# Patient Record
Sex: FEMALE | Race: WHITE
[De-identification: names, ages, dates, MRNs, and addresses within clinical notes are randomized per-mention and may not be internally consistent; named-entity substitution may affect disease eponyms.]

---

## 2018-07-03 ENCOUNTER — HOSPITAL ENCOUNTER (EMERGENCY)
Dept: HOSPITAL 58 - ED | Age: 83
Discharge: HOME | End: 2018-07-03
Payer: COMMERCIAL

## 2018-07-03 VITALS — DIASTOLIC BLOOD PRESSURE: 65 MMHG | TEMPERATURE: 97.9 F | SYSTOLIC BLOOD PRESSURE: 118 MMHG

## 2018-07-03 VITALS — BODY MASS INDEX: 23.5 KG/M2

## 2018-07-03 DIAGNOSIS — M19.90: ICD-10-CM

## 2018-07-03 DIAGNOSIS — M79.605: Primary | ICD-10-CM

## 2018-07-03 DIAGNOSIS — R53.1: ICD-10-CM

## 2018-07-03 DIAGNOSIS — F03.90: ICD-10-CM

## 2018-07-03 DIAGNOSIS — M25.562: ICD-10-CM

## 2018-07-03 PROCEDURE — 99282 EMERGENCY DEPT VISIT SF MDM: CPT

## 2018-07-03 NOTE — DI
EXAM:  Left knee four views 

  

HISTORY:  Pain 

  

FINDINGS:  No comparison imaging.  The patient has underwent previous knee arthroplasty.  The prosthe
tic units appear intact with no evidence of dislodgement or loosening.  No acute fracture is identifi
ed.  There is no joint effusion seen.  Bones are demineralized. 

  

  

IMPRESSION: 

No fracture or dislocation.

## 2018-07-03 NOTE — ED.PDOC
General


ED Provider: 


Dr. SANJEEV PAN





Chief Complaint: Extremity Pain/Injury


Stated Complaint: C/O Left leg , knee pain. Diffiuclty walking. Pt states left 

knee has been hurting for a long time but has been worse for the last 6 months. 

Pt is new at [facility] and was sent out for evaluation of increased difficulty in 

walking and left anterior knee pain.Family states it seems that her condition 

has changed and worsened. No recent trauma. hx prev BL--TKA


Time Seen by Physician: 15:10


Mode of Arrival: Wheelchair


Information Source: Patient, Family


Exam Limitations: No limitations


Nursing and Triage Documentation Reviewed and Agree: Yes


Does patient meet sepsis criteria?: No


System Inflammatory Response Syndrome: Not Applicable


Sepsis Protocol: 


For patient's 13 years and over:





Temp is 96.8 and below  and greater


Pulse >90 BPM


Resp >20/minute


Acutely Altered Mental Status





Are patient's symptoms suggestive of a new infection, such as:


   -Pneumonia


   -Skin, Soft Tissue


   -Endocarditis


   -UTI


   -Bone, Joint Infection


   -Implantable Device


   -Acute Abdominal Infection


   -Wound Infection


   -Meningitis


   -Blood Stream Catheter Infection


   -Unknown








Musculoskeletal Complaint Exam





- Knee Pain Complaint/Exam


Mechanism of Injury: Reports: No known trauma


Onset/Duration: changed in last week


Symptoms Are: Still present


Onset of Pain: Reports: Prior to arrival


Initial Severity: Moderate


Current Severity: Mild


Location: Reports: Diffuse


Character: Reports: Dull, Aching


Alleviating: Reports: Rest


Aggravating: Reports: Movement


Associated Signs and Symptoms: Denies: Swelling, Redness, Bruising, Fever, 

Weakness, Numbness, Tingling


Able to Bear Weight: Yes


Related History: Reports: Similar episode


Septic Arthritis Risk Factors: Reports: None


Gout Risk Factors: Reports: None


Related Surgical History: Reports: Right Knee, Left Knee


Knee Findings: Present: Tenderness


Tenderness: Absent: Pre-patellar, Joint, Tibial Tuberosity


Lachman Test Positive: No


Danielle Test Positive: No


Limited Range of Motion: Absent: Active, Passive, Flexion, Extension, Patellar 

apprehension


Differential Diagnoses: Contusion, Patellofemoral Syndrome, Strain





Review of Systems





- Review Of Systems


Constitutional: Reports: No symptoms


Eyes: Reports: No symptoms


Ears, Nose, Mouth, Throat: Reports: No symptoms


Respiratory: Reports: No symptoms


Cardiac: Reports: No symptoms


GI: Reports: No symptoms


: Reports: No symptoms


Musculoskeletal: Reports: No symptoms, Joint pain


Skin: Reports: No symptoms


Neurological: Reports: No symptoms


Endocrine: Reports: No symptoms


Hematologic/Lymphatic: Reports: No symptoms


All Other Systems: Reviewed and Negative





Past Medical History





- Past Medical History


Previously Healthy: Yes


Endocrine: Reports: None


Cardiovascular: Reports: None, Hypertension


Respiratory: Reports: None


Hematological: Reports: None


Gastrointestinal: Reports: None


Genitourinary: Reports: None, Other (oxybutynin)


Neuro/Psych: Reports: None, Anxiety, Dementia


Musculoskeletal: Reports: None, Arthritis, Joint Pain


Cancer: Reports: None


Last Menstrual Period: none





- Surgical History


General Surgical History: Reports: None





- Family History


Family History: Reports: None





- Social History


Smoking Status: Never smoker


Hx Substance Use: No


Alcohol Screening: None





Physical Exam





- Physical Exam


Appearance: Well-appearing, No pain distress, Well-nourished


Eyes: MEIR, EOMI, Conjunctiva clear


ENT: Ears normal, Nose normal, Oropharynx normal


Respiratory: Airway patent, Breath sounds clear, Breath sounds equal, 

Respirations nonlabored


Cardiovascular: RRR, Pulses normal, No rub, No murmur


GI/: Soft, Nontender, No masses, Bowel sounds normal, No Organomegaly


Musculoskeletal: Normal strength, ROM intact (no localized joint restriction, 

no appreciable effusion ), No edema, No calf tenderness


Skin: Warm, Dry, Normal color


Neurological: Sensation intact, Motor intact, Reflexes intact, Cranial nerves 

intact, Alert, Oriented


Psychiatric: Affect appropriate, Mood appropriate





Interpretation





- Radiology Interpretation


Radiology Interpretation By: Radiologist


Radiology Results: No acute changes


Exam Interpreted: Other (pelvis and knee xrays)





Critical Care Note





- Critical Care Note


Total Time (mins): 0





Course





- Course


Orders, Labs, Meds: 


Orders











 Category Date Time Status


 


 KNEE, LEFT 4 VIEWS Stat RADS  07/03/18 16:08 Completed


 


 KNEE, RIGHT 4 VIEWS Stat RADS  07/03/18 16:08 Completed


 


 PELVIS & CHAU HIPS Stat RADS  07/03/18 16:08 Completed











Vital Signs: 


 











  Temp Pulse Resp BP Pulse Ox


 


 07/03/18 13:57  97.9 F  60  18  118/65  99














Departure





- Departure


Time of Disposition: 17:40


Disposition: HOME SELF-CARE


Discharge Problem: 


 Weakness, Leg pain, Degenerative arthritis, Dementia





Instructions:  Arthralgia (ED), Leg Pain (ED)


Condition: Fair


Pt referred to PMD for follow-up: Yes (1 week)


IPMP verified?: No


Allergies/Adverse Reactions: 


Allergies





Iodinated Contrast- Oral and IV Dye [Iodinated Contrast Media - IV Dye] Adverse 

Reaction (Verified 10/28/13 19:03)


 








Home Medications: 


Ambulatory Orders





Fluticasone/Salmeterol [Advair 100-50 Diskus] 1 puff IH Q12H PRN 08/05/13 


Metoprolol Succinate [Toprol Xl] 25 mg PO DAILY 08/05/13 


Sertraline HCl [Zoloft] 100 mg PO DAILY 08/05/13 


Lisinopril/Hydrochlorothiazide [Zestoretic 20-12.5 mg Tablet] 1 each PO DAILY 10

/28/13 


Alprazolam 0.25 mg PO Q8HR PRN 07/03/18 


Aspirin [Aspirin EC] 81 mg PO DAILY 07/03/18 


Atorvastatin Calcium 40 mg PO BEDTIME 07/03/18 


Cyanocobalamin (Vitamin B-12) [Cyanocobalamin Injection] 1,000 mcg IJ MONTHLY 07 /03/18 


Donepezil HCl 5 mg PO DAILY 07/03/18 


Oxybutynin Chloride [Ditropan Xl] 5 mg PO DAILY 07/03/18 


Tramadol HCl 50 mg PO Q8HR PRN 07/03/18 








Disposition Discussed With: Patient, Family


Additional Information: 





Explained to family no radiographic finding of acute injury or abnormalities of 

the prev TKA

## 2018-07-03 NOTE — DI
EXAM:  Right knee, four view 

  

HISTORY:  Pain 

  

COMPARISON:  None 

  

FINDINGS/IMPRESSION:  There is right knee arthroplasty.  No fracture, dislocation, or evidence for lo
osening.

## 2018-07-03 NOTE — DI
EXAM:  Pelvis and bilateral hips 

  

HISTORY:  Pain 

  

COMPARISON:  None 

  

TECHNIQUE:  Single view pelvis and single frog-leg view right and left hip were performed 

  

FINDINGS:  Bones appear demineralized.  Sacroiliac joints intact.  Sacral arcuate intact.  Hip joints
 appear normal.  No fracture or dislocation. Degenerative change in the spine. Surgical changes in th
e pelvis. 

  

IMPRESSION:  No fracture or dislocation.

## 2018-08-24 ENCOUNTER — HOSPITAL ENCOUNTER (INPATIENT)
Dept: HOSPITAL 58 - ED | Age: 83
LOS: 6 days | Discharge: NURSING, REHAB OR LONG-TERM CARE | DRG: 690 | End: 2018-08-30
Attending: INTERNAL MEDICINE | Admitting: INTERNAL MEDICINE

## 2018-08-24 VITALS — BODY MASS INDEX: 25.4 KG/M2

## 2018-08-24 DIAGNOSIS — R06.02: ICD-10-CM

## 2018-08-24 DIAGNOSIS — N39.0: Primary | ICD-10-CM

## 2018-08-24 DIAGNOSIS — I10: ICD-10-CM

## 2018-08-24 DIAGNOSIS — R63.0: ICD-10-CM

## 2018-08-24 DIAGNOSIS — R10.9: ICD-10-CM

## 2018-08-24 DIAGNOSIS — E78.5: ICD-10-CM

## 2018-08-24 DIAGNOSIS — F03.90: ICD-10-CM

## 2018-08-24 DIAGNOSIS — K56.41: ICD-10-CM

## 2018-08-24 DIAGNOSIS — R41.0: ICD-10-CM

## 2018-08-24 DIAGNOSIS — E86.0: ICD-10-CM

## 2018-08-24 DIAGNOSIS — E87.6: ICD-10-CM

## 2018-08-24 DIAGNOSIS — R53.1: ICD-10-CM

## 2018-08-24 PROCEDURE — 84443 ASSAY THYROID STIM HORMONE: CPT

## 2018-08-24 PROCEDURE — 82550 ASSAY OF CK (CPK): CPT

## 2018-08-24 PROCEDURE — 99284 EMERGENCY DEPT VISIT MOD MDM: CPT

## 2018-08-24 PROCEDURE — 85025 COMPLETE CBC W/AUTO DIFF WBC: CPT

## 2018-08-24 PROCEDURE — 84439 ASSAY OF FREE THYROXINE: CPT

## 2018-08-24 PROCEDURE — 82803 BLOOD GASES ANY COMBINATION: CPT

## 2018-08-24 PROCEDURE — 36415 COLL VENOUS BLD VENIPUNCTURE: CPT

## 2018-08-24 PROCEDURE — 80053 COMPREHEN METABOLIC PANEL: CPT

## 2018-08-24 PROCEDURE — 97802 MEDICAL NUTRITION INDIV IN: CPT

## 2018-08-24 PROCEDURE — 84484 ASSAY OF TROPONIN QUANT: CPT

## 2018-08-24 PROCEDURE — 83605 ASSAY OF LACTIC ACID: CPT

## 2018-08-24 PROCEDURE — 84145 PROCALCITONIN (PCT): CPT

## 2018-08-24 PROCEDURE — 93005 ELECTROCARDIOGRAM TRACING: CPT

## 2018-08-24 PROCEDURE — 87081 CULTURE SCREEN ONLY: CPT

## 2018-08-24 PROCEDURE — 87040 BLOOD CULTURE FOR BACTERIA: CPT

## 2018-08-24 PROCEDURE — 93010 ELECTROCARDIOGRAM REPORT: CPT

## 2018-08-24 PROCEDURE — 81001 URINALYSIS AUTO W/SCOPE: CPT

## 2018-08-24 RX ADMIN — SODIUM CHLORIDE SCH: 0.9 INJECTION, SOLUTION INTRAVENOUS at 18:00

## 2018-08-24 NOTE — CT
EXAM:  CT ABDOMEN AND PELVIS 

  

HISTORY:  Abdominal pain, nausea and vomiting 

  

TECHNIQUE:  CT abdomen and pelvis without intravenous contrast.  Images were reconstructed using 3 mm
 section thickness.  Reformations were prepared. 

COMPARISON:  None 

  

FINDINGS: 

  

Diagnostic limitations exist without including contrast enhanced images.  There is a 1.5 cm low atten
uation lesion of the inferior right hepatic lobe which could represent a cyst although is indetermina
te.  Pancreas is within normal limits.  No definite gallbladder is seen. Mild ectasia of the common b
ile duct without gross evidence of choledocholithiasis.  No definite pancreatic inflammation or mass.
  Adrenal glands are within normal limits.  Kidneys and ureters are unremarkable.  There is mild to m
oderate atherosclerotic disease. 

  

Stomach appears grossly unremarkable.  An appendix, if present is not seen.  There may be circumferen
tial wall thickening of the lower cecum.  Moderate amount retained fecal material within the rectal v
barrie distending the rectum to about 6.6 cm. The lowermost rectum is nondistended and not adequately e
valuated on this exam, regional mass or stricture not excluded.  Bowel gas pattern is nonobstructive.
  Urinary bladder is unremarkable.  No uterus is seen.  There is no ascites. 

  

Postop changes of the ventral abdominal wall without herniation.  Bones reveal generalized deminerali
zation and moderately severe degenerative changes of the spine with scoliosis.  No pneumoperitoneum i
s seen.  See also same day CT thorax report. 

  

  

IMPRESSION: 

1.  There may be circumferential wall thickening of the lower cecum. Neoplasia cannot be excluded. 

2.  Moderate amount retained fecal material within the rectal vault distending the rectum to about 6.
6 cm. Early fecal impaction of the rectum should be considered. The lowermost rectum is nondistended 
and not adequately evaluated on this exam, regional mass or stricture not excluded.  Bowel gas patter
n is nonobstructive.

## 2018-08-24 NOTE — ED.PDOC
General


ED Provider: 


Dr. YENI REVELES





Chief Complaint: Nausea/Vomiting


Stated Complaint: shortness of breath nausea , vomiting


Time Seen by Physician: 15:30


Mode of Arrival: Walk-In


Information Source: EMT


Exam Limitations: No limitations


Primary Care Provider: 


ALYX GIBSON





Nursing and Triage Documentation Reviewed and Agree: Yes


Does patient meet sepsis criteria?: No (seen with pt's nurse  and paul and 

family)


System Inflammatory Response Syndrome: Not Applicable (refused oxygen )


Sepsis Protocol: 


For patient's 13 years and over:





Temp is 96.8 and below  and greater


Pulse >90 BPM


Resp >20/minute


Acutely Altered Mental Status





Are patient's symptoms suggestive of a new infection, such as:


   -Pneumonia


   -Skin, Soft Tissue


   -Endocarditis


   -UTI


   -Bone, Joint Infection


   -Implantable Device


   -Acute Abdominal Infection


   -Wound Infection


   -Meningitis


   -Blood Stream Catheter Infection


   -Unknown








Respiratory Complaint Exam





- Shortness of Air Complaint/Exam


Symptoms Are: Still present


Timing: Intermittent


Initial Severity: Moderate


Current Severity: Mild


Character: Reports: Dyspnea at rest


Aggravating: Reports: Recumbent position


Alleviating: Reports: None


Associated Signs and Symptoms: Reports: Cough.  Denies: Wheezing, Chest pain 

with cough, Chest pain, Fever, Chills, Diaphoresis, Nasal congestion, Dizziness

, Calf pain, Calf swelling, Edema, Rapid breathing, Labored breathing, 

Decreased intake


History of Healthcare-Acquired Pneumonia: No


Pulmonary Embolism Risk Factors: Reports: Bedrest


Tuberculosis Risk Factors: Reports: None


Home Oxygen Use: No


Recent Stress Test: No


Recent Echo/LV Function: No


Respiratory Distress: None


Stridor Present: No


Tracheal Deviation: No


Subcutaneous Emphysema: No


Accessory Muscle Use: No


Retractions: Not Present


Diminished Breath Sounds: No


Prolonged Expiratory Phase: No


Unable to Speak Full Sentences: No


Fatigue: No


Leg Swelling: No


Nohemy's Sign Present: No


Grunting Respirations: No


Kussmaul Respirations: No


Differential Diagnoses: Pneumonia





Review of Systems





- Review Of Systems


Constitutional: Reports: Malaise, Weakness


Eyes: Reports: No symptoms


Ears, Nose, Mouth, Throat: Reports: No symptoms


Respiratory: Reports: Cough, Short of air


Cardiac: Reports: No symptoms


GI: Reports: Abdominal pain, Nausea, Vomiting


: Reports: No symptoms


Musculoskeletal: Reports: No symptoms


Skin: Reports: No symptoms


Neurological: Reports: No symptoms


Endocrine: Reports: No symptoms


Hematologic/Lymphatic: Reports: No symptoms


All Other Systems: Reviewed and Negative





Past Medical History





- Past Medical History


Previously Healthy: Yes


Endocrine: Reports: None


Cardiovascular: Reports: None, Hypertension


Respiratory: Reports: None


Hematological: Reports: None


Gastrointestinal: Reports: None


Genitourinary: Reports: None, Other (oxybutynin)


Neuro/Psych: Reports: None, Anxiety, Dementia


Musculoskeletal: Reports: None, Arthritis, Joint Pain


Cancer: Reports: None


Last Menstrual Period: N/A





- Surgical History


General Surgical History: Reports: None





- Family History


Family History: Reports: None





- Social History


Smoking Status: Never smoker


Hx Substance Use: No


Alcohol Screening: None





- Immunizations


Tetanus Shot up to Date: Yes





Physical Exam





- Physical Exam


Appearance: Ill-appearing


Ill-appearing: Moderate


Pain Distress: Mild


Eyes: MEIR, EOMI, Conjunctiva clear


ENT: Dry mucosa


Respiratory: Airway patent, Breath sounds clear, Breath sounds equal, 

Respirations nonlabored


Cardiovascular: RRR, Pulses normal, No rub, No murmur


GI/: Soft, Nontender, No masses, Bowel sounds normal, No Organomegaly


Musculoskeletal: Normal strength, ROM intact, No edema, No calf tenderness


Skin: Warm, Dry, Normal color


Neurological: Sensation intact, Motor intact, Reflexes intact, Cranial nerves 

intact, Alert, Oriented


Psychiatric: Affect appropriate, Mood appropriate





Interpretation





- Radiology Interpretation


Radiology Interpretation By: Radiologist


Radiology Results: Positive (colonic neoplasm)





Physician Notification





- Case Discussed


Physician Notified: pmd


Time of Notification: 17:33 (admitt )


Admit To: Inpatient (telm)





Critical Care Note





- Critical Care Note


Total Time (mins): 0





Course





- Course


Hematology/Chemistry: 


 08/24/18 16:06





 08/24/18 16:06


Orders, Labs, Meds: 





Lab Review











  08/24/18 08/24/18 08/24/18





  15:34 16:06 16:06


 


WBC   5.21 


 


RBC   4.42 


 


Hgb   12.8 


 


Hct   39.0 


 


MCV   88.2 


 


MCH   29.0 


 


MCHC   32.8 


 


RDW Coeff of Nickie   13.8 


 


Plt Count   212 


 


Immature Gran % (Auto)   0.4 


 


Neut % (Auto)   70.7 


 


Lymph % (Auto)   16.7 


 


Mono % (Auto)   8.3 


 


Eos % (Auto)   3.3 


 


Baso % (Auto)   0.6 


 


Immature Gran # (Auto)   0.0 


 


Neut # (Auto)   3.7 


 


Lymph # (Auto)   0.9 


 


Mono # (Auto)   0.4 


 


Eos # (Auto)   0.2 


 


Baso # (Auto)   0.0 


 


Puncture Site  Rbrach  


 


O2 Saturation  98.0  


 


ABG pH  7.601 H*  


 


ABG pCO2  26.6 L  


 


ABG pO2  88.0  


 


ABG HCO3  26.2 H  


 


ABG Total CO2  27  


 


ABG Base Excess  5 H  


 


FiO2 %  21.0  


 


Sodium    136


 


Potassium    3.7


 


Chloride    97 L


 


Carbon Dioxide    30


 


Anion Gap    12.7


 


BUN    23 H


 


Creatinine    1.09


 


Estimated GFR (MDRD)    47.00


 


BUN/Creatinine Ratio    21.10


 


Glucose    107


 


Lactic Acid   


 


Calcium    9.7


 


Total Bilirubin    0.9


 


AST    29


 


ALT    20


 


Alkaline Phosphatase    121


 


Total Creatine Kinase    99


 


Troponin I    0.1040


 


Total Protein    6.6


 


Albumin    3.4


 


Globulin    3.2


 


Albumin/Globulin Ratio    1.06


 


Procalcitonin   














  08/24/18 08/24/18





  16:06 16:06


 


WBC  


 


RBC  


 


Hgb  


 


Hct  


 


MCV  


 


MCH  


 


MCHC  


 


RDW Coeff of Nickie  


 


Plt Count  


 


Immature Gran % (Auto)  


 


Neut % (Auto)  


 


Lymph % (Auto)  


 


Mono % (Auto)  


 


Eos % (Auto)  


 


Baso % (Auto)  


 


Immature Gran # (Auto)  


 


Neut # (Auto)  


 


Lymph # (Auto)  


 


Mono # (Auto)  


 


Eos # (Auto)  


 


Baso # (Auto)  


 


Puncture Site  


 


O2 Saturation  


 


ABG pH  


 


ABG pCO2  


 


ABG pO2  


 


ABG HCO3  


 


ABG Total CO2  


 


ABG Base Excess  


 


FiO2 %  


 


Sodium  


 


Potassium  


 


Chloride  


 


Carbon Dioxide  


 


Anion Gap  


 


BUN  


 


Creatinine  


 


Estimated GFR (MDRD)  


 


BUN/Creatinine Ratio  


 


Glucose  


 


Lactic Acid  6.2 


 


Calcium  


 


Total Bilirubin  


 


AST  


 


ALT  


 


Alkaline Phosphatase  


 


Total Creatine Kinase  


 


Troponin I  


 


Total Protein  


 


Albumin  


 


Globulin  


 


Albumin/Globulin Ratio  


 


Procalcitonin   < 0.05








Orders











 Category Date Time Status


 


 ABG DRAW REQUEST Stat CARDIO  08/24/18 15:34 Completed


 


 EKG-(ED ONLY) Stat CARDIO  08/24/18 15:34 Completed


 


 ED IV/MEDIPORT/POWERPORT .ONCE EMERGENCY  08/24/18 15:35 Active


 


 ABG Stat LAB  08/24/18 15:34 Completed


 


 BLOOD CULTURE Stat LAB  08/24/18 16:06 Received


 


 CBC W/ AUTO DIFF Stat LAB  08/24/18 16:06 Completed


 


 COMPREHENSIVE METABOLIC PANEL Stat LAB  08/24/18 16:06 Completed


 


 CREATINE KINASE Stat LAB  08/24/18 16:06 Completed


 


 LACTIC ACID Stat LAB  08/24/18 16:06 Completed


 


 PROCALCITONIN Stat LAB  08/24/18 16:06 Completed


 


 TROPONIN I Stat LAB  08/24/18 16:06 Completed


 


 URINALYSIS C & S IF INDICATED Stat LAB  08/24/18 15:34 Uncollected


 


 0.9 % Sodium Chloride [Saline Flush] MEDS  08/24/18 15:35 Active





 1 syr IVF PRN PRN   


 


 Sodium Chloride 0.9% [Sodium Chloride] 1,000 ml MEDS  08/24/18 15:35 Active





  mls/hr   


 


 CT ABDOMEN/PELVIS WO CONTRAST Stat RADS  08/24/18 15:35 Completed


 


 CT CHEST W/O CONTRAST Stat RADS  08/24/18 15:35 Completed








Medications











Generic Name Dose Route Start Last Admin





  Trade Name Freq  PRN Reason Stop Dose Admin


 


Sodium Chloride  1,000 mls @ 125 mls/hr  08/24/18 15:35  08/24/18 15:30





  Sodium Chloride  IV  08/24/18 23:34  125 mls/hr





  .Q8H STA   Administration





     





     





     





     


 


Sodium Chloride  1 syr  08/24/18 15:35  





  Saline Flush  IVF   





  PRN PRN   





  To flush IV   





     





     





     











Vital Signs: 





 











  Temp Pulse Resp BP Pulse Ox


 


 08/24/18 15:28  97.1 F L  58 L  18  115/48 L  93 L














Departure





- Departure


Time of Disposition: 17:33 (family  stated pt is DNR OMD NITIFIED )


Disposition: ADMITTED AS INPATIENT


Discharge Problem: 


 Nausea, Vomiting, Shortness of breath





Condition: Good


Pt referred to PMD for follow-up: Yes


IPMP verified?: No


Additional Instructions: 


Please call your Family Physician as soon as possible to schedule a follow-up 

appointment.


Allergies/Adverse Reactions: 


Allergies





Iodinated Contrast- Oral and IV Dye [Iodinated Contrast Media - IV Dye] Adverse 

Reaction (Verified 10/28/13 19:03)


 








Home Medications: 


Ambulatory Orders





Fluticasone/Salmeterol [Advair 100-50 Diskus] 1 puff IH Q12H PRN 08/05/13 


Metoprolol Succinate [Toprol Xl] 25 mg PO DAILY 08/05/13 


Sertraline HCl [Zoloft] 100 mg PO DAILY 08/05/13 


Lisinopril/Hydrochlorothiazide [Zestoretic 20-12.5 mg Tablet] 1 each PO DAILY 10

/28/13 


Alprazolam 0.25 mg PO Q8HR PRN 07/03/18 


Aspirin [Aspirin EC] 81 mg PO DAILY 07/03/18 


Atorvastatin Calcium 40 mg PO BEDTIME 07/03/18 


Cyanocobalamin (Vitamin B-12) [Cyanocobalamin Injection] 1,000 mcg IJ MONTHLY 07 /03/18 


Donepezil HCl 5 mg PO DAILY 07/03/18 


Oxybutynin Chloride [Ditropan Xl] 5 mg PO DAILY 07/03/18 


Tramadol HCl 50 mg PO Q8HR PRN 07/03/18 








Disposition Discussed With: Patient

## 2018-08-24 NOTE — CT
EXAM:  CT of the chest without contrast 

  

History:  Cough. 

  

Comparison:  Chest radiograph 10/20/2013, CT abdomen pelvis 08/24/2018 

  

Technique:  Multiplanar CT images through the chest were obtained without the administration of IV co
ntrast 

  

Findings:  Heart size is upper limits of normal.  Coronary calcifications.  Trace anterior pericardia
l effusion.  No thoracic aortic aneurysm.  No axillary adenopathy. There is subsegmental atelectasis 
within the lingula.  No consolidated pneumonia.  No pleural fluid and no pneumothorax.  No lung avni
s or lung nodules. 

  

For details in the upper abdomen, please see dedicated CT abdomen pelvis done on the same day.  No ac
[place]ine osseous abnormalities.  Degenerative changes of the spine. A few subacute anterior right rib frac
tures with callous formation. 

  

Impression: 

1.  Plate-like atelectasis within the lingula but no consolidated pneumonia. 

2.  Coronary artery disease

## 2018-08-25 RX ADMIN — DONEPEZIL HYDROCHLORIDE ONE MG: 10 TABLET, FILM COATED ORAL at 09:49

## 2018-08-25 RX ADMIN — LISINOPRIL AND HYDROCHLOROTHIAZIDE ONE TAB: 20; 12.5 TABLET ORAL at 09:47

## 2018-08-25 RX ADMIN — ASPIRIN SCH: 81 TABLET, COATED ORAL at 09:35

## 2018-08-25 RX ADMIN — SERTRALINE HYDROCHLORIDE SCH: 50 TABLET, FILM COATED ORAL at 21:01

## 2018-08-25 RX ADMIN — CEFTRIAXONE SODIUM SCH MLS/HR: 1 INJECTION, POWDER, FOR SOLUTION INTRAMUSCULAR; INTRAVENOUS at 09:22

## 2018-08-25 RX ADMIN — ASPIRIN ONE: 81 TABLET, COATED ORAL at 09:56

## 2018-08-25 RX ADMIN — SERTRALINE HYDROCHLORIDE SCH MG: 50 TABLET, FILM COATED ORAL at 20:59

## 2018-08-25 RX ADMIN — ASPIRIN ONE MG: 81 TABLET, COATED ORAL at 09:48

## 2018-08-25 RX ADMIN — LISINOPRIL AND HYDROCHLOROTHIAZIDE ONE: 20; 12.5 TABLET ORAL at 09:57

## 2018-08-25 RX ADMIN — SODIUM CHLORIDE SCH MLS/HR: 0.9 INJECTION, SOLUTION INTRAVENOUS at 09:23

## 2018-08-25 RX ADMIN — DONEPEZIL HYDROCHLORIDE ONE: 10 TABLET, FILM COATED ORAL at 09:56

## 2018-08-25 RX ADMIN — DONEPEZIL HYDROCHLORIDE SCH: 10 TABLET, FILM COATED ORAL at 09:34

## 2018-08-26 RX ADMIN — ONDANSETRON PRN MG: 2 INJECTION INTRAMUSCULAR; INTRAVENOUS at 17:00

## 2018-08-26 RX ADMIN — SERTRALINE HYDROCHLORIDE SCH MG: 50 TABLET, FILM COATED ORAL at 21:01

## 2018-08-26 RX ADMIN — SODIUM CHLORIDE SCH MLS/HR: 0.9 INJECTION, SOLUTION INTRAVENOUS at 00:29

## 2018-08-26 RX ADMIN — DONEPEZIL HYDROCHLORIDE SCH MG: 10 TABLET, FILM COATED ORAL at 10:05

## 2018-08-26 RX ADMIN — ASPIRIN SCH MG: 81 TABLET, COATED ORAL at 10:05

## 2018-08-26 RX ADMIN — CEFTRIAXONE SODIUM SCH MLS/HR: 1 INJECTION, POWDER, FOR SOLUTION INTRAMUSCULAR; INTRAVENOUS at 10:05

## 2018-08-26 RX ADMIN — SODIUM CHLORIDE SCH MLS/HR: 0.9 INJECTION, SOLUTION INTRAVENOUS at 17:01

## 2018-08-27 RX ADMIN — SERTRALINE HYDROCHLORIDE SCH MG: 50 TABLET, FILM COATED ORAL at 22:05

## 2018-08-27 RX ADMIN — DONEPEZIL HYDROCHLORIDE SCH MG: 10 TABLET, FILM COATED ORAL at 10:58

## 2018-08-27 RX ADMIN — ASPIRIN SCH MG: 81 TABLET, COATED ORAL at 10:59

## 2018-08-27 RX ADMIN — DONEPEZIL HYDROCHLORIDE SCH: 10 TABLET, FILM COATED ORAL at 10:30

## 2018-08-27 RX ADMIN — ASPIRIN SCH: 81 TABLET, COATED ORAL at 10:30

## 2018-08-27 RX ADMIN — SODIUM CHLORIDE SCH MLS/HR: 0.9 INJECTION, SOLUTION INTRAVENOUS at 06:51

## 2018-08-27 RX ADMIN — CEFTRIAXONE SODIUM SCH MLS/HR: 1 INJECTION, POWDER, FOR SOLUTION INTRAMUSCULAR; INTRAVENOUS at 10:58

## 2018-08-27 NOTE — PN
DATE OF SERVICE:  08/25/18



SUBJECTIVE:  

81-year-old white female hospitalized with shortness of breath. The patient 
also has change in mental status with drowsiness and sleepiness for the past 
couple of weeks.  was present in the room. He wants DNR. The patient's 
labs are acceptable. 



REVIEW OF SYSTEMS: 

CONSTITUTIONAL: No night sweats. No fatigue, malaise, lethargy. No fever or 
chills.

HEENT: Eyes: No visual changes. No eye pain. No eye discharge. ENT: No runny 
nose. No epistaxis. No sinus pain. No sore throat. No odynophagia. No 
congestion.

RESPIRATORY: No cough, no congestion. No hemoptysis. No shortness of breath. 

CARDIOVASCULAR: No angina symptoms. No CHF symptoms. No atypical chest pain for 
CAD. No palpitations. No orthopnea.

GASTROINTESTINAL: Appetite is poor.  No abdominal pain. No nausea or vomiting. 
No diarrhea or constipation. No hematemesis. No hematochezia.

GENITOURINARY: No urgency. No frequency. No dysuria. No hematuria. No 
obstructive symptoms. No discharge. No pain. No significant abnormal bleeding.

MUSCULOSKELETAL: No musculoskeletal pain; no joint swelling. 

NEUROLOGICAL: Sleepy. No headache. No neck pain. No syncope. No seizures. No 
dizziness.

PSYCHIATRIC: Not anxious. No depression. No suicidal thoughts. No homicidal 
thoughts.

SKIN:  No rash. No lesions. No wounds.

ENDOCRINE: No unexplained weight loss. No weight gain. 

HEMATOLOGIC/LYMPHATIC: No anemia. No purpura. No petechiae. No prolonged or 
excessive bleeding. No palpable lymph nodes.



PHYSICAL EXAMINATION: 

V/S: Temperature 98.3, pulse 54, respiratory rate 14, BP 90/42, pulse ox 97%. 

HEENT:  Head normocephalic, atraumatic.  Eyes: Extraocular muscles are intact.  
Pupils are equal, round and reactive to light and accommodation.  Ears:  No 
lesions.  Nose appeared normal. Throat: No exudate or erythema.

NECK: Supple. No JVD, no carotid bruit.  No lymphadenopathy or thyromegaly. 

LUNGS:  Clear to auscultation.  Percussion note normal.  Chest symmetrical. 

HEART:  S1, S2, no S3. No murmurs.  No cyanosis or clubbing.  No ascites.  
Pulses: Dorsalis pedis and posterior tibial pulses +1 to +2 both sides. 

ABDOMEN:  Soft.  Nontender.  Bowel sounds active. No CVA tenderness. No mass 
felt. 

EXTREMITIES:  No edema.  Full range of motion of all extremities, equal. 

NEUROLOGIC:  No focal deficit. Cranial nerves II through XII are grossly 
intact.  No headache, no double vision or headache. 

SKIN: Not dry.  Intact.  Turgor - normal. 

LYMPHATIC:  No palpable lymph nodes/no lymphedema. 

MUSCULOSKELETAL:  Normal joints with no swelling. Muscle tone is normal. 



PLAN:

1.  D/C Zoloft, Atorvastatin, Zestoretic and Oxybutynin





CONDITION: Stable



PROGNOSIS:  Guarded. 

TIME SPENT:  More than 30 minutes. 



Plan and coordination of the patient's care discussed in the presence of nurse. 
HERRERA

## 2018-08-27 NOTE — PN
DATE OF SERVICE:  08/26/18



SUBJECTIVE:  

The patient seems to be doing the same but she is awake. She is uncooperative. 
BP systolic is 99. All the hypertensive medications have been held. 



REVIEW OF SYSTEMS: 

CONSTITUTIONAL: No night sweats. No fatigue, malaise, lethargy. No fever or 
chills.

HEENT: Eyes: No visual changes. No eye pain. No eye discharge. ENT: No runny 
nose. No epistaxis. No sinus pain. No sore throat. No odynophagia. No 
congestion.

RESPIRATORY: No cough, no congestion. No hemoptysis. No shortness of breath. 

CARDIOVASCULAR: No angina symptoms. No CHF symptoms. No atypical chest pain for 
CAD. No palpitations. No orthopnea.

GASTROINTESTINAL: No abdominal pain. No nausea or vomiting. No diarrhea or 
constipation. No hematemesis. No hematochezia.

GENITOURINARY: No urgency. No frequency. No dysuria. No hematuria. No 
obstructive symptoms. No discharge. No pain. No significant abnormal bleeding.

MUSCULOSKELETAL: No musculoskeletal pain; no joint swelling. 

NEUROLOGICAL: Confused but not in distress. No headache. No neck pain. No 
syncope. No seizures. No dizziness.

PSYCHIATRIC: Not anxious. No depression. No suicidal thoughts. No homicidal 
thoughts.

SKIN:  No rash. No lesions. No wounds.

ENDOCRINE: No unexplained weight loss. No weight gain. 

HEMATOLOGIC/LYMPHATIC: No anemia. No purpura. No petechiae. No prolonged or 
excessive bleeding. No palpable lymph nodes.



PHYSICAL EXAMINATION: 

HEENT:  Head normocephalic, atraumatic.  Eyes: Extraocular muscles are intact.  
Pupils are equal, round and reactive to light and accommodation.  Ears:  No 
lesions.  Nose appeared normal. Throat: No exudate or erythema.

NECK: Supple. No JVD, no carotid bruit.  No lymphadenopathy or thyromegaly. 

LUNGS:  Clear to auscultation.  Percussion note normal.  Chest symmetrical. 

HEART:  S1, S2, no S3. No murmurs.  No cyanosis or clubbing.  No ascites.  
Pulses: Dorsalis pedis and posterior tibial pulses +1 to +2 both sides. 

ABDOMEN:  Soft.  Nontender.  Bowel sounds active. No CVA tenderness. No mass 
felt. 

EXTREMITIES:  No edema.  Full range of motion of all extremities, equal. 

NEUROLOGIC:  No focal deficit. Cranial nerves II through XII are grossly 
intact.  No headache, no double vision or headache. 

SKIN: Not dry.  Intact.  Turgor - normal. 

LYMPHATIC:  No palpable lymph nodes/no lymphedema. 

MUSCULOSKELETAL:  Normal joints with no swelling. Muscle tone is normal. 



ASSESSMENT:

1.  DEMENTIA

2.  WORSENING HYPOTENSION - OFF ALL ANTIHYPERTENSIVE MEDICATIONS



PLAN:

Continue to monitor the patient's vitals. Electrolytes are normal. IV fluids 
infusing. 



CONDITION: STABLE



TIME SPENT:  More than 30 minutes. 



Plan and coordination of the patient's care discussed in the presence of nurse. 
HERRERA

## 2018-08-27 NOTE — PN
DATE OF SERVICE:  08/24/18 - ADMIT NOTE



SUBJECTIVE:  

87-year-old white female was sent from the nursing home with complaint of being 
short of breath. The patient was seen and examined in the emergency room where 
she was noted to be confused, sleepy.  All of her labs like hemoglobin was 12.0 
with hematocrit of 37, WBC 5,000, normal differential, creatinine 0.9, BUN 22, 
potassium 3.6. EKG showed sinus rhythm, no acute changes. Glucose 87. Estimated 
GFR was 53 cc/min. 



MEDICATIONS:  The patient's medications were: 

Advair

Toprol

Zoloft

Zestoretic

Aspirin

Aricept

Ditropan

Tramadol

Vitamin D



PAST MEDICAL HISTORY:

Dementia

Depression

Hypertension

Dyslipidemia



PHYSICAL EXAMINATION: 

V/S: Temperature 98, pulse 60, respiratory rate 14, /60. Pulse ox 98% on 
2L. BMI 25. 

HEENT:  Head normocephalic, atraumatic.  Eyes: Extraocular muscles are intact.  
Pupils are equal, round and reactive to light and accommodation.  Ears:  No 
lesions.  Nose appeared normal. Throat: No exudate or erythema.

NECK: Supple. No JVD, no carotid bruit.  No lymphadenopathy or thyromegaly. 

LUNGS:  Clear to auscultation.  Percussion note normal.  Chest symmetrical. 

HEART:  S1, S2, no S3. No murmurs.  No cyanosis or clubbing.  No ascites.  
Pulses: Dorsalis pedis and posterior tibial pulses +1 to +2 both sides. 

ABDOMEN:  Soft.  Nontender.  Bowel sounds active. No CVA tenderness. No mass 
felt. 

EXTREMITIES:  No edema.  Full range of motion of all extremities, equal. 

NEUROLOGIC:  No focal neurological deficit. Cranial nerves II through XII are 
grossly intact.  No headache, no double vision or headache. 

SKIN: Not dry.  Intact.  Turgor - normal. 

LYMPHATIC:  No palpable lymph nodes/no lymphedema. 

MUSCULOSKELETAL:  Normal joints with no swelling. Muscle tone is normal. 



The  was present in the emergency room. The sons were called. The 
patient was DNR and again the DNR status was confirmed. 



ASSESSMENT:

1.  DROWSINESS

2.  DECREASE IN APPETITE

3.  DEHYDRATION

4.  WORSENING OF DEMENTIA



PLAN:

1.  Continue all the medication for now.

2.  IV fluids. 

3.  Telemetry.

4.  Neurochecks.

5.  The patient is DNR. 



TIME SPENT:  More than 30 minutes. 



Plan and coordination of the patient's care discussed in the presence of nurse. 
HERRERA

## 2018-08-27 NOTE — RS.SLPCNOT
Speech Case Note


Date of Note: 08/27/18


Title: Speech Consult


Note: SLP discussed patient's case with nursing staff. Reports of nausea, food 

refusal, with poor appetite and limited verbalizations were reported. The SLP 

recommended a dietary consult to address nutritional needs. The SLP entered the 

patient's room and pt was asleep sitting upright in the bed. The pt took one 

sip of thin liquids via straw. No overt s/s of aspiration were observed. The 

SLP did observed the patient swallow a large amount of air, followed with four 

burps. When the patient burped, she had frequent gagging behaviors and a gurlgy 

sound approximately at the UES. The SLP suspects severe GERD and may be causing 

some difficulty with her PO intake. However d/t mentation, the patient does not 

express her signs/symptoms appropriately. No skilled ST warranted at this time.

  SLP recommends pacing meals, small sips of liquids via open cup, and maintain 

upright sitting posture frequently throughout the day.

## 2018-08-27 NOTE — CT
EXAM:  CT of the head without contrast 

  

History:  Confusion, altered mental status 

  

Comparison:  Head CT 10/28/2013 

  

Technique: Multiplanar CT images through the head were obtained without the administration of IV cont
rast 

  

Findings: The visualized paranasal sinuses and mastoid air cells are clear in general.  No acute calv
arial abnormalities. 

  

Intracranially there is age appropriate atrophy.  No dominant mass or midline shift.  No hydrocephalo
us.  No acute intracranial hemorrhage or abnormal extraaxial fluid collections.  No acute intracrania
l hemorrhage or abnormal extraaxial fluid collections. 

  

Impression:  No acute intracranial process.  Stable chronic age-related changes

## 2018-08-27 NOTE — HP
DATE OF SERVICE:  08/24/18



REASON FOR HOSPITALIZATION: 

Shortness of breath



HISTORY OF PRESENT ILLNESS:

This 87-year-old white female was brought to the emergency room from the 
nursing home where she has for the last two weeks deteriorating according to 
the . She is not able to eat properly and sleeping most of the time. 
Also short of breath at times. Overall, condition is deteriorating according to 
the . She was seen and examined in the ER by ER attending and was 
hospitalized with diagnosis of shortness of breath and possibility of 
dehydration. The patient is DNR. Unable to give any history. 



PAST MEDICAL/SURGICAL HISTORY: 

The patient is unable to give history.



REVIEW OF SYSTEMS:(UNABLE TO GET AS THE PATIENT IS DROWSY AND SLEEPY)

CONSTITUTIONAL:  No night sweats.  No fatigue, malaise, lethargy.  No fever or 
chills.

HEENT:  Eyes:  No visual changes.  No eye pain.  No eye discharge.  ENT:  No 
runny nose.  No epistaxis.  No sinus pain.  No sore throat.  No odynophagia.  
No ear pain.  No congestion.

RESPIRATORY:  No cough, no congestion.  No hemoptysis.  No shortness of breath.

CARDIOVASCULAR:  No angina symptoms. No CHF symptoms.  No atypical chest pain 
for CAD.  No palpitations. No PND. No orthopnea.

GASTROINTESTINAL: Appetite is reduced. No abdominal pain.  No nausea or 
vomiting.  No diarrhea or constipation.  No hematemesis.  No hematochezia.  

GENITOURINARY:  No urgency.  No frequency.  No dysuria.  No hematuria.  No 
obstructive symptoms.  No discharge.  No pain.  No significant abnormal 
bleeding.

MUSCULOSKELETAL:  No musculoskeletal pain.  No joint swelling.  No arthritis.

NEUROLOGICAL: The patient is unable to get up, usually gets up with help. No 
headache. No neck pain. No syncope. No seizures.  No dizziness. 

PSYCHIATRIC:   Not anxious. No depression.  No suicidal thoughts. No homicidal 
thoughts. 

SKIN:  No rash.  No lesions.  No wounds.

ENDOCRINE:  No unexplained weight loss.  No weight gain. 

HEMATOLOGIC/LYMPHATIC:  No anemia.  No purpura.  No petechiae.  No prolonged or 
excessive bleeding.  No palpable lymph nodes.



PERSONAL/FAMILY/SOCIAL HISTORY:

The patient is nonsmoker, no alcohol abuse. She is  and lives at the 
nursing home. She has dementa. 



MEDICATIONS: 

Xanax 0.25 q.8

Aspirin one a day

Aricept 5 mg p.o. daily

Metoprolol 25 mg p.o. daily

Zoloft 100 mg at bedtime

Lisinopril/Hydrochlorthiazide 20/12.5 twice a day

Atorvastatin 40 mg p.o. at bedtime

Vitamin B12 1 cc every one monthly

Oxybutynin 5 mg p.o. at bedtime

Tramadol 50 mg p.o. q.8



ALLERGIES: IODINATED CONTRAST



PHYSICAL EXAMINATION: 

GENERAL:  The patient is confused, sleepy.  

VITAL SIGNS:  Temperature 98, pulse 70, respiratory rate 15, BP systolic 110, 
diastolic 60, pulse ox 95% on room air, looks somewhat pale. Skin is dry.

HEENT: Face is symmetrical. Head normocephalic, atraumatic.  Eyes: Extraocular 
muscles are intact.  Pupils are equal, round and reactive to light and 
accommodation.  Ears:  No lesions.  Nose appeared normal. Throat: No exudate or 
erythema.

NECK: Supple. No JVD, no carotid bruit.  No lymphadenopathy or thyromegaly. 

LUNGS: Decreased breath sounds but clear to auscultation.  Percussion note 
normal.  Chest  symmetrical. 

HEART:  S1, S2, no S3. No murmurs.  No cyanosis or clubbing.  No ascites.  
Pulses: Dorsalis pedis and posterior tibial pulses +1 to +2 bilaterally. 

ABDOMEN:  Soft.  Nontender.  Bowel sounds active. No CVA tenderness. No mass 
felt. 

EXTREMITIES:  No edema.  Full range of motion of all extremities, equal. 

NEUROLOGIC: Moving all extremities. No focal deficit. Cranial nerves II through 
XII are grossly intact.  No headache, no double vision or headache. 

SKIN: Not dry.  Intact.  Turgor - normal. 

LYMPHATIC:  No palpable lymph nodes/no lymphedema. 

MUSCULOSKELETAL:  Normal joints with no swelling. Muscle tone is normal.



UA showed 1+ ketone, 2+ bilirubin. CK-troponin negative. Procalcitonin 0.05 
less than that. Lactic acid 6.2 which is normal. Hemoglobin 12.8, hematocrit 
39. WBC 5,000, normal differential. CT scan of the abdomen and pelvis showed 
possibility of thickening of the lower cecum. Neoplasm cannot be excluded. 
Moderate amount of retained fecal material noted. ABG p02 88, pc02 26, pH 7.60 
on room air with 98% saturation. HC03 was 26, creatinine 1, BUN 23, glucose 107
, liver profile normal. Chest x-ray showed plate-like atelectasis, coronary 
artery disease. 



ASSESSMENT:

1.  CONFUSION

2.  DEHYDRATION

3.  DEMENTIA

4.  SHORTNESS OF BREATH



PLAN:

1.  Give IV fluids

2.  IV Rocephin with possibility of UTI with change in her condition

3.  Watch for fluid overload

4.  Telemetry

5.  Watch for blood pressure to rise up with IV fluids; if it doesn't then may 
have to discontinue antihypertensive medications. 

6.  The family was present in the emergency room on admission and DNR was 
discussed again and it was decided and reconfirmed that the patient is DNR. 



TIME SPENT: More than 70 minutes. 
HERRERA

## 2018-08-27 NOTE — PCM.PROG
Attending Provider: 


ATTENDING PROVIDER:


Dr. ALYX GIBSON





DATE OF SERVICE:  08/27/18





SUBJECTIVE: 


This 87 year old WHITE/ F was hospitalized 08/24/18 with shortness of 

breath. The patient's other problems are sleepiness, drowsiness. She has been 

taken off Zoloft and Xanax.  The patient is constipated. She is refusing to 

eat.  





REVIEW OF SYSTEMS:


CONSTITUTIONAL: No night sweats. No fatigue, malaise, lethargy. No fever or 

chills.


HEENT: Eyes: No visual changes. No eye pain. No eye discharge. ENT: No runny 

nose. No epistaxis. No sinus pain. No odynophagia. No congestion.


RESPIRATORY: No cough, no congestion. No hemoptysis.  No shortness of breath.


CARDIOVASCULAR: No angina symptoms. No CHF symptoms. No atypical chest pain for 

CAD. No palpitations. No orthopnea..


GASTROINTESTINAL: Constipation. Poor appetite. No abdominal pain. No nausea or 

vomiting. No diarrhea.  No hematemesis. No hematochezia.


GENITOURINARY: No urgency. No frequency. No dysuria. No hematuria. No 

obstructive symptoms. No discharge. No pain. No significant abnormal bleeding.


MUSCULOSKELETAL: No musculoskeletal pain; no joint swelling. 


NEUROLOGICAL: Confusion; sleepy.  No headache. No neck pain. No syncope. No 

seizures. No dizziness.


PSYCHIATRIC: Sleepiness, drowsiness. No suicidal thoughts. No homicidal 

thoughts.


SKIN:  No rash. No lesions. No wounds.


ENDOCRINE: No unexplained weight loss. No weight gain. 


HEMATOLOGIC/LYMPHATIC: No anemia. No purpura. No petechiae. No prolonged or 

excessive bleeding. No palpable lymph nodes.





PHYSICAL EXAMINATION:


GENERAL:  The patient is sleepy, lying in bed in no distress. 


VITAL SIGNS:  Temperature 98 F, Pulse 59, Respiratory Rate 18, /60, Pulse 

Ox 99%


HEENT:  Head normocephalic, atraumatic.  Eyes: Extraocular muscles are intact.  

Pupils are equal, round and reactive to light and accommodation.  Ears:  No 

lesions.  Nose appeared normal. Throat: No exudate or erythema.


NECK: Supple. No JVD, no carotid bruit.  No lymphadenopathy or thyromegaly. 


LUNGS:  Clear to auscultation.  Percussion note normal.  Chest symmetrical. 


HEART:  S1, S2, no S3. No murmurs.  No cyanosis or clubbing.  No ascites.  

Pulses: Dorsalis pedis and posterior tibial pulses +1 to +2 both sides. 


ABDOMEN:  Soft.  Non-tender.  Bowel sounds active. No CVA tenderness. No mass 

felt. 


EXTREMITIES:  No edema.  Full range of motion of all extremities, equal. 


NEUROLOGIC:  No focal deficit. Cranial nerves II through XII are grossly 

intact.  No headache, no double vision or headache.  


SKIN: Warm and dry.  Intact.  Turgor-normal. 


LYMPHATIC:  No palpable lymph nodes/no lymphedema. 


MUSCULOSKELETAL:  Normal joints with no swelling. Muscle tone is normal. 








LAB REVIEW:





 08/26/18 06:30 





 08/26/18 06:30 











08/26/18 07:55: Urine Color Yellow, Urine Clarity Clear, Urine pH 7.0, Ur 

Specific Gravity 1.015, Urine Protein Negative, Urine Glucose (UA) Negative, 

Urine Ketones 1+, Urine Blood Negative, Urine Nitrite Negative, Urine Bilirubin 

2+, Urine Urobilinogen 4.0, Ur Leukocyte Esterase Negative





ASSESSMENT:  


Overall medical status is stable except for refusal to eat, sleepy, has 

constipation and deterioration of mental status.





PLAN:


1.  CT scan of head without contrast. 


2.  T4, TSH.


3.  Ducolax suppository. 





Plan and coordination of the patient's care discussed in the presence of Case 

Manager and nurse.





CONDITION:  Stable











SCRIBED BY:


HARRIS CUNHA  scribed while in presence of service performed by 

Dr. ALYX GIBSON on 08/27/18 ([id number])

## 2018-08-28 RX ADMIN — CEFTRIAXONE SODIUM SCH MLS/HR: 1 INJECTION, POWDER, FOR SOLUTION INTRAMUSCULAR; INTRAVENOUS at 09:53

## 2018-08-28 RX ADMIN — ASPIRIN SCH MG: 81 TABLET, COATED ORAL at 10:01

## 2018-08-28 RX ADMIN — POTASSIUM CHLORIDE SCH MLS/HR: 149 INJECTION, SOLUTION, CONCENTRATE INTRAVENOUS at 21:44

## 2018-08-28 RX ADMIN — SERTRALINE HYDROCHLORIDE SCH: 50 TABLET, FILM COATED ORAL at 20:08

## 2018-08-28 RX ADMIN — DONEPEZIL HYDROCHLORIDE SCH MG: 10 TABLET, FILM COATED ORAL at 10:01

## 2018-08-28 RX ADMIN — SODIUM CHLORIDE SCH MLS/HR: 0.9 INJECTION, SOLUTION INTRAVENOUS at 01:16

## 2018-08-28 RX ADMIN — POTASSIUM CHLORIDE SCH MLS/HR: 149 INJECTION, SOLUTION, CONCENTRATE INTRAVENOUS at 09:54

## 2018-08-28 NOTE — PCM.PROG
Attending Provider: 


ATTENDING PROVIDER:


Dr. ALYX GIBSON





DATE OF SERVICE:  08/28/18





SUBJECTIVE: 


This 87 year old WHITE/ F was hospitalized 08/24/18 with shortness of 

breath, dehydration, and possible fecal impaction.  The patient's  bowels moved 

all night with Ducolax suppository.  She is awake and alert; the first time 

awake answering questions, some inappropriate. 





REVIEW OF SYSTEMS:


CONSTITUTIONAL: No night sweats. No fatigue, malaise, lethargy. No fever or 

chills.


HEENT: Eyes: No visual changes. No eye pain. No eye discharge. ENT: No runny 

nose. No epistaxis. No sinus pain. No odynophagia. No congestion.


RESPIRATORY: No cough, no congestion. No hemoptysis.  No shortness of breath.


CARDIOVASCULAR: No angina symptoms. No CHF symptoms. No atypical chest pain for 

CAD. No palpitations. No orthopnea..


GASTROINTESTINAL: No abdominal pain. No nausea or vomiting. No diarrhea or 

constipation. No hematemesis. No hematochezia.


GENITOURINARY: No urgency. No frequency. No dysuria. No hematuria. No 

obstructive symptoms. No discharge. No pain. No significant abnormal bleeding.


MUSCULOSKELETAL: No musculoskeletal pain; no joint swelling. 


NEUROLOGICAL: More awake, alert with confusion. No headache. No neck pain. No 

syncope. No seizures. No dizziness.


PSYCHIATRIC: Not anxious. No depression. No suicidal thoughts. No homicidal 

thoughts.


SKIN:  No rash. No lesions. No wounds.


ENDOCRINE: No unexplained weight loss. No weight gain. 


HEMATOLOGIC/LYMPHATIC: No anemia. No purpura. No petechiae. No prolonged or 

excessive bleeding. No palpable lymph nodes.





PHYSICAL EXAMINATION:


GENERAL:  The patient is awake, alert, confused lying  in bed in no distress. 


VITAL SIGNS:  Temperature 98.1 F, Pulse 87, Respiratory Rate 18, /63, 

Pulse Ox 97%


HEENT:  Head normocephalic, atraumatic.  Eyes: Extraocular muscles are intact.  

Pupils are equal, round and reactive to light and accommodation.  Ears:  No 

lesions.  Nose appeared normal. Throat: No exudate or erythema.


NECK: Supple. No JVD, no carotid bruit.  No lymphadenopathy or thyromegaly. 


LUNGS:  Clear to auscultation.  Percussion note normal.  Chest symmetrical. 


HEART:  S1, S2, no S3. No murmurs.  No cyanosis or clubbing.  No ascites.  

Pulses: Dorsalis pedis and posterior tibial pulses +1 to +2 both sides. 


ABDOMEN:  Soft.  Non-tender.  Bowel sounds active. No CVA tenderness. No mass 

felt. 


EXTREMITIES:  No edema.  Full range of motion of all extremities, equal. 


NEUROLOGIC:  No focal deficit. Cranial nerves II through XII are grossly 

intact.  No headache, no double vision or headache.  


SKIN: Warm and dry.  Intact.  Turgor-normal. 


LYMPHATIC:  No palpable lymph nodes/no lymphedema. 


MUSCULOSKELETAL:  Normal joints with no swelling. Muscle tone is normal. 








LAB REVIEW:





 08/28/18 04:15 





 08/28/18 04:15 











08/28/18 04:15: Sodium 136, Potassium 3.4 L, Chloride 104, Carbon Dioxide 23, 

Anion Gap 12.4, BUN 12, Creatinine 0.74, Estimated GFR (MDRD) 74.00, BUN/

Creatinine Ratio 16.21, Glucose 76 L, Calcium 8.5, Total Bilirubin 0.9, AST 23, 

ALT 13, Alkaline Phosphatase 85, Total Protein 5.2 L, Albumin 2.7 L, Globulin 

2.5, Albumin/Globulin Ratio 1.08


08/28/18 04:15: WBC 9.02, RBC 3.93 L, Hgb 11.5 L, Hct 34.7 L, MCV 88.3, MCH 29.3

, MCHC 33.1, RDW Coeff of Nickie 13.9, Plt Count 173, Immature Gran % (Auto) 0.4, 

Neut % (Auto) 79.6, Lymph % (Auto) 11.2, Mono % (Auto) 6.8, Eos % (Auto) 1.6, 

Baso % (Auto) 0.4, Immature Gran # (Auto) 0.0, Neut # (Auto) 7.2 H, Lymph # (

Auto) 1.0, Mono # (Auto) 0.6, Eos # (Auto) 0.1, Baso # (Auto) 0.0


08/27/18 09:30: TSH 1.401, Free T4 1.12





ASSESSMENT:  


1.  DEMENTIA


2.  FECAL IMPACTION RESOLVED


3.  DEHYDRATION SEEMS TO HAVE RESOLVED WITH GOOD SKIN TURGOR


4.  HYPOKALEMIA, MILD 





PLAN:


1.  Encouraged to eat.


2.  Potassium Chloride through IV fluids.


3.  CT scan of brain, negative. 





Plan and coordination of the patient's care discussed in the presence of Case 

Manager and nurse.





CONDITION:  Stable











SCRIBED BY:


HARRIS CUNHA  scribed while in presence of service performed by 

Dr. ALYX GIBSON on 08/28/18 ([id number])

## 2018-08-29 RX ADMIN — SERTRALINE HYDROCHLORIDE SCH: 50 TABLET, FILM COATED ORAL at 20:55

## 2018-08-29 RX ADMIN — POTASSIUM CHLORIDE SCH MLS/HR: 149 INJECTION, SOLUTION, CONCENTRATE INTRAVENOUS at 10:30

## 2018-08-29 RX ADMIN — DONEPEZIL HYDROCHLORIDE SCH: 10 TABLET, FILM COATED ORAL at 08:38

## 2018-08-29 RX ADMIN — CEFTRIAXONE SODIUM SCH MLS/HR: 1 INJECTION, POWDER, FOR SOLUTION INTRAMUSCULAR; INTRAVENOUS at 08:38

## 2018-08-29 RX ADMIN — ASPIRIN SCH: 81 TABLET, COATED ORAL at 08:38

## 2018-08-30 VITALS — TEMPERATURE: 98.1 F | DIASTOLIC BLOOD PRESSURE: 67 MMHG | SYSTOLIC BLOOD PRESSURE: 136 MMHG

## 2018-08-30 RX ADMIN — ONDANSETRON PRN MG: 2 INJECTION INTRAMUSCULAR; INTRAVENOUS at 06:21

## 2018-08-30 RX ADMIN — POTASSIUM CHLORIDE SCH MLS/HR: 149 INJECTION, SOLUTION, CONCENTRATE INTRAVENOUS at 01:18

## 2018-08-30 RX ADMIN — CEFTRIAXONE SODIUM SCH MLS/HR: 1 INJECTION, POWDER, FOR SOLUTION INTRAMUSCULAR; INTRAVENOUS at 08:55

## 2018-08-30 RX ADMIN — ASPIRIN SCH: 81 TABLET, COATED ORAL at 08:58

## 2018-08-30 RX ADMIN — DONEPEZIL HYDROCHLORIDE SCH: 10 TABLET, FILM COATED ORAL at 08:58

## 2018-08-30 NOTE — PN
CODING FOR BILLING



08/24/18           LEVEL 5

08/25/18   INTERMEDIATE

08/26/18   INTERMEDIATE

08/27/18   INTERMEDIATE

08/28/18   INTERMEDIATE

08/29/18   INTERMEDIATE

08/30/18   DISCHARGE



MTDD

## 2018-08-30 NOTE — CM.DICTOOL
ADMISSION: 


08/24/18 18:09





DISCHARGE: 


AUGUST 30, 2018








DATE OF SERVICE: 08/30/18


FINAL DIAGNOSIS





DEHYDRATION


NAUSEA/VOMITING


FECAL IMPACTION, RESOLVED


DEMENTIA


HYPERTENSION


MILD HYPOKALEMIA, IMPROVED


COLON CANCER


DYSLIPIDEMIA


HYSTERECTOMY


CHOLECYSTECTOMY


COLON RESECTION


BILATERAL TKR





LAST VITALS











Temp Pulse Resp BP Pulse Ox


 


 98.1 F   88   18   136/67   97 


 


 08/30/18 06:00  08/30/18 06:00  08/30/18 06:00  08/30/18 06:00  08/30/18 06:00








TAKE THESE MEDICATIONS 





Alprazolam (Xanax)  0.25 mg PO Q8HR PRN


   PRN Reason: Anxiety


Donepezil HCl (Aricept)  5 mg PO DAILY [facility]


          Last Admin: 08-29/2018  Dose: Not Given


Sertraline HCl (Zoloft)  75 mg PO BEDTIME [facility]


   Last Admin: 08/29/18 20:55 Dose:  Not Given


Megace 40 mg PO BID


          Last Admin:


Vitamin B-12 1000 mcg IM Monthly


          Last Admin:





ALLERGIES





Iodinated Contrast- Oral and IV Dye [Iodinated Contrast Media - IV Dye] Adverse 

Reaction (Verified 10/28/13 19:03)


 





DISCONTINUED MEDICATIONS





Oxybutynin Chloride (Ditropan)


Zestoretic 20-12.5 BID


Tramadol HCL


Metoprolol Succinate


Fluticasone/Salmeterol


Atorvastatin


ASA














NEW PRESCRIPTIONS: 


Megace 40 mg BID  (may crush)








SMOKING: 


Not Applicable








DISEASE SPECIFIC EDUCATION: 


Patient has dementia. Not Applicable








LAB REVIEW:





 08/30/18 04:50 





 08/30/18 04:50 











08/30/18 04:50: Sodium 139, Potassium 4.4, Chloride 108 H, Carbon Dioxide 21 L, 

Anion Gap 14.4, BUN 11, Creatinine 0.71, Estimated GFR (MDRD) 78.00, BUN/

Creatinine Ratio 15.49, Glucose 63 L, Calcium 8.9, Total Bilirubin 0.8, AST 24, 

ALT 13, Alkaline Phosphatase 93, Total Protein 5.6 L, Albumin 2.9 L, Globulin 

2.7, Albumin/Globulin Ratio 1.07


08/30/18 04:50: WBC 6.02, RBC 4.27, Hgb 12.5, Hct 38.7  D, MCV 90.6, MCH 29.3, 

MCHC 32.3, RDW Coeff of Nickie 14.2, Plt Count 170, Immature Gran % (Auto) 1.0, 

Neut % (Auto) 61.4, Lymph % (Auto) 25.1, Mono % (Auto) 6.6, Eos % (Auto) 5.1, 

Baso % (Auto) 0.8, Immature Gran # (Auto) 0.1, Neut # (Auto) 3.7, Lymph # (Auto

) 1.5, Mono # (Auto) 0.4, Eos # (Auto) 0.3, Baso # (Auto) 0.1





PLAN: 


Discharge to [facility]





Diet: Pureed


Encourage Liquids Frequently


May crush oral medications


Boost Plus BID





CBC, CMP in one week, then monthly


Vital Signs Daily for one week, then weekly


Dietitian to see


Up to chair for meals; remain upright for at least 30 minutes after eating





Turn every 2 hours


Incontinent care prn


Decubitus Precautions





Code Status: DNR





NIC Cro[facility]ett/Dr. Mayorga to see on nursing home rounds in 7-10 days. 





Mrs. Sánchez is alert to person. She mumbles at times, but does not directly 

answer questions. She is dependent on nursing staff for bed mobility, transfers

, feeding and personal care. She is incontinent of bladder and bowel. Meal 

intakes have been poor; but she will take sips of water or juice at times. 

Intake of medications is poor even when medications are crushed and put in 

applesauce. Slight excoriation is noted to buttocks, coccyx but no open areas 

or decubitus ulcers are noted. 








_________________


Rex Mayorga MD

## 2018-08-30 NOTE — DS
DATE OF SERVICE:  08/30/18



FINAL DIAGNOSIS: 

1.  DEHYDRATION

2.  NAUSEA/VOMITING

3.  FECAL IMPACTION, RESOLVED

4.  DEMENTIA

5.  HYPERTENSION

6.  MILD HYPOKALEMIA, IMPROVED

7.  COLON CANCER

8.  DYSLIPIDEMIA

9.  HYSTERECTOMY

10. CHOLECYSTECTOMY

11. COLON RESECTION

12. BILATERAL TKR



DISCHARGE INSTRUCTIONS:

1.  Discharge to [facility]

2.  Followup appointment - will see on nursing home rounds

3.  May crush oral medications

4.  CBC, CMP in one week then monthly

5.  Vital signs daily for one week, then weekly

6.  Dietician to see

7.  Up to chair for meals, remain upright for at least 30 minutes after eating

8.  Turn every 2 hours

9.  Incontinent care p.r.n.

10. Decubitus precautions



MEDICATIONS AT DISCHARGE:

Xanax 0.25 mg p.o. q.8h p.r.n.

Aricept 5 mg p.o. daily DG

Zoloft 75 mg p.o. bedtime DG

Megace 40 mg p.o. b.i.d. 

Vitamin B12 1000 mcg IM monthly



DISCONTINUED MEDICATIONS:

Oxybutynin Chloride (Ditropan)

Zestoretic 20-12.5 b.i.d.

Tramadol HCL

Metoprolol Succinate

Fluticasone/Salmeterol

Atorvastatin

ASA

NEW PRESCRIPTIONS:

Megace 40 mg b.i.d. (may crush)



DIET INSTRUCTIONS: 

Pureed

Encourage liquids frequently

Boost Plus b.i.d. 



ACTIVITY:

Turn every 2 hours. Up to chair for meals, remain upright for at least 30 
minutes after eating.



SMOKING:

N/A



DISEASE SPECIFIC EDUCATION:

Patient has dementia. N/A



HOSPITAL COURSE:  

The patient is hospitalized with worsening dementia and overall medical status.
  The patient has declined appetite refusing to eat or take medications. She 
was treated for possible UTI. Rocephin was used she was taken off all 
medications except for Zoloft. Xanax will be given p.r.n.  She was taken off 
Toprol and Zestoretic. Blood pressure was on lower side of normal. She was 
advised to have pureed diet. Appetite stimulate will be added.  She is not able 
to go through GI workup like EGD or possible colonoscopy.  The patient's family 
wants DNR and comfort measures.  



VITAL SIGNS: Temperature 98.1, pulse 88, respiratory rate 18, /667, pulse 
ox 97%. 



CONDITION: Stable



PROGNOSIS:  Guarded





TIME SPENT:  More than 60 minutes. 
MTDD

## 2018-08-31 NOTE — PN
DATE OF SERVICE:  8/29/18



SUBJECTIVE:  

The patient is alert, stubborn, refusing to eat. Oral intake is very poor. She 
is on IV fluids. CT scan was unremarkable. 



REVIEW OF SYSTEMS: 

CONSTITUTIONAL: No night sweats. No fatigue, malaise, lethargy. No fever or 
chills.

HEENT: Eyes: No visual changes. No eye pain. No eye discharge. ENT: No runny 
nose. No epistaxis. No sinus pain. No sore throat. No odynophagia. No 
congestion.

RESPIRATORY: No cough, no congestion. No hemoptysis. No shortness of breath. 

CARDIOVASCULAR: No angina symptoms. No CHF symptoms. No atypical chest pain for 
CAD. No palpitations. No orthopnea.

GASTROINTESTINAL:  Refuses to eat. No abdominal pain. No nausea or vomiting. No 
diarrhea or constipation. No hematemesis. No hematochezia.

GENITOURINARY: No urgency. No frequency. No dysuria. No hematuria. No 
obstructive symptoms. No discharge. No pain. No significant abnormal bleeding.

MUSCULOSKELETAL: No musculoskeletal pain; no joint swelling. 

NEUROLOGICAL:  No headache. No neck pain. No syncope. No seizures. No dizziness.

PSYCHIATRIC: Not anxious. No depression. No suicidal thoughts. No homicidal 
thoughts.

SKIN:  No rash. No lesions. No wounds.

ENDOCRINE: No unexplained weight loss. No weight gain. 

HEMATOLOGIC/LYMPHATIC: No anemia. No purpura. No petechiae. No prolonged or 
excessive bleeding. No palpable lymph nodes.



PHYSICAL EXAMINATION: 

HEENT:  Head normocephalic, atraumatic.  Eyes: Extraocular muscles are intact.  
Pupils are equal, round and reactive to light and accommodation.  Ears:  No 
lesions.  Nose appeared normal. Throat: No exudate or erythema.

NECK: Supple. No JVD, no carotid bruit.  No lymphadenopathy or thyromegaly. 

LUNGS:  Clear to auscultation.  Percussion note normal.  Chest symmetrical. 

HEART:  S1, S2, no S3. No murmurs.  No cyanosis or clubbing.  No ascites.  
Pulses: Dorsalis pedis and posterior tibial pulses +1 to +2 both sides. 

ABDOMEN:  Soft.  Nontender.  Bowel sounds active. No CVA tenderness. No mass 
felt. 

EXTREMITIES:  No edema.  Full range of motion of all extremities, equal. 

NEUROLOGIC:  No focal deficit. Cranial nerves II through XII are grossly 
intact.  No headache, no double vision or headache. 

SKIN: Not dry.  Intact.  Turgor - normal. 

LYMPHATIC:  No palpable lymph nodes/no lymphedema. 

MUSCULOSKELETAL:  Normal joints with no swelling. Muscle tone is normal. 



The patient is refusing to take any medications. Dementia has advanced. She is 
hypoproteinemic, malnourished with anemia with hemoglobin of 10.3, hematocrit 
31.  Prognosis is poor considering her status of mind with dementia. 





TIME SPENT:  More than 30 minutes. 



Plan and coordination of the patient's care discussed in the presence of nurse. 
HERRERA